# Patient Record
Sex: FEMALE | Race: WHITE
[De-identification: names, ages, dates, MRNs, and addresses within clinical notes are randomized per-mention and may not be internally consistent; named-entity substitution may affect disease eponyms.]

---

## 2018-05-14 ENCOUNTER — HOSPITAL ENCOUNTER (INPATIENT)
Dept: HOSPITAL 31 - C.EROB | Age: 33
LOS: 3 days | Discharge: HOME | End: 2018-05-17
Attending: OBSTETRICS & GYNECOLOGY | Admitting: OBSTETRICS & GYNECOLOGY
Payer: COMMERCIAL

## 2018-05-14 VITALS — BODY MASS INDEX: 27.4 KG/M2

## 2018-05-14 DIAGNOSIS — Z3A.39: ICD-10-CM

## 2018-05-14 DIAGNOSIS — O41.03X0: ICD-10-CM

## 2018-05-14 LAB
ALBUMIN SERPL-MCNC: 3.1 G/DL (ref 3.5–5)
ALBUMIN/GLOB SERPL: 1 {RATIO} (ref 1–2.1)
ALT SERPL-CCNC: 11 U/L (ref 9–52)
AST SERPL-CCNC: 19 U/L (ref 14–36)
BACTERIA #/AREA URNS HPF: (no result) /[HPF]
BASOPHILS # BLD AUTO: 0.1 K/UL (ref 0–0.2)
BASOPHILS NFR BLD: 1.3 % (ref 0–2)
BILIRUB UR-MCNC: NEGATIVE MG/DL
BUN SERPL-MCNC: 6 MG/DL (ref 7–17)
CALCIUM SERPL-MCNC: 8.6 MG/DL (ref 8.6–10.4)
EOSINOPHIL # BLD AUTO: 0.1 K/UL (ref 0–0.7)
EOSINOPHIL NFR BLD: 0.8 % (ref 0–4)
ERYTHROCYTE [DISTWIDTH] IN BLOOD BY AUTOMATED COUNT: 14.3 % (ref 11.5–14.5)
GFR NON-AFRICAN AMERICAN: > 60
GLUCOSE UR STRIP-MCNC: NORMAL MG/DL
HGB BLD-MCNC: 12.6 G/DL (ref 11–16)
LEUKOCYTE ESTERASE UR-ACNC: (no result) LEU/UL
LYMPHOCYTES # BLD AUTO: 1.3 K/UL (ref 1–4.3)
LYMPHOCYTES NFR BLD AUTO: 16.4 % (ref 20–40)
MCH RBC QN AUTO: 31.4 PG (ref 27–31)
MCHC RBC AUTO-ENTMCNC: 34 G/DL (ref 33–37)
MCV RBC AUTO: 92.2 FL (ref 81–99)
MONOCYTES # BLD: 0.6 K/UL (ref 0–0.8)
MONOCYTES NFR BLD: 7.9 % (ref 0–10)
NEUTROPHILS # BLD: 6 K/UL (ref 1.8–7)
NEUTROPHILS NFR BLD AUTO: 73.6 % (ref 50–75)
NRBC BLD AUTO-RTO: 0.1 % (ref 0–2)
PH UR STRIP: 7 [PH] (ref 5–8)
PLATELET # BLD: 148 K/UL (ref 130–400)
PMV BLD AUTO: 10.7 FL (ref 7.2–11.7)
PROT UR STRIP-MCNC: NEGATIVE MG/DL
RBC # BLD AUTO: 4 MIL/UL (ref 3.8–5.2)
RBC # UR STRIP: (no result) /UL
SP GR UR STRIP: 1.01 (ref 1–1.03)
SQUAMOUS EPITHIAL: 8 /HPF (ref 0–5)
UROBILINOGEN UR-MCNC: NORMAL MG/DL (ref 0.2–1)
WBC # BLD AUTO: 8.2 K/UL (ref 4.8–10.8)

## 2018-05-14 NOTE — OBHP
===========================

Datetime: 2018 10:12

===========================

   

IP Adm Impression:  Term, intrauterine pregnancy

IP Admit Plan:  Admit to unit

Admit Comment, IP Provider:   @ 39.1 wkws [place] c/o vaginal bleeidn and sidhcarge since [facility]
reaseing intensty adn fruqency, bright red, no blood clots with cramping q 5-10 min /10, deisre pain
 medicaion. pt dnie slof, +FM

      

   OB: P0

   GYN: dnies hx of anobmral pap, fiboidf,s ovairna cyst, STI

   PMH: Hypothyoid

   PSH: dnies

   MEDS: PNV

   SHX: negaitve etoh/tobacco.drugs

   NKDA

      

   A/P  @ 39.1 wks GA with VB iol for oligohydmanions

   -admit to L+D

   np, ivf

   bedside sonogram rhiannon 4.8cm, no evidcne of placenta previa

   cytoce

   pain mangment

   con tcurent mange

      

Pelvic Type - PN:  Adequate

Extremities - PN:  Normal

Abdomen - PN:  Normal

Back - PN:  Normal

Breast - PN:  Normal

Lungs - PN:  Normal

Heart - PN:  Normal

Thyroid - PN:  Normal

Neurologic - PN:  Normal

HEENT - PN:  Normal

General - PN:  Normal

FHR - Baseline A Provider:  125

Membranes, Provider:  Intact

Contraction Comments Provider:  5-10 min

IP Prenatal Hx Assessment:  The Prenatal History has been Reviewed and is Current

EGA AdmitDate IP:  39.1

Vital Signs Provider:  Reviewed; Within Normal Limits

IP Chief Complaint:  Uterine contractions

NICHD Variability Prov Fetus A:  Moderate 6-25bpm

FHR Category Provider Fetus A:  Category I

NICHD Decel Fetus A IP Provider:  None

Dilatation, Provider:  2-3

Effacement, Provider:  50

Station, Provider:  -3

Genitourinary Exam:  Normal

DTRs - PN:  Normal

## 2018-05-14 NOTE — OBPN
===========================

Datetime: 2018 12:47

===========================

   

IP Progress Impression:  Normal progression of labor

IP Progress Plan:  Continue present management

Membranes, Provider:  Intact

FHR - Baseline A Provider:  125

Gestation - Est Wks by US:  39.1

IP Progress Note Comment:  pt ting and examien rperots cramping on and off 8/10 denies lof, vb, +FM

   vss

   ve; 4cm

      

   A/P  @ 39.1 wks GA in labor

   s/p cytotec

   for pitocin augmentaiotn

   pain mangment

   cont current mangnet

      

Vital Signs Provider:  Reviewed; Within Normal Limits

FHR Category Provider Fetus A:  Category I

NICHD Variability Prov Fetus A:  Moderate 6-25bpm

Dilatation, Provider:  4

NICHD Decel Fetus A IP Provider:  None

   

===========================

Datetime: 2018 10:12

===========================

   

Contraction Comments Provider:  5-10 min

Effacement, Provider:  50

Station, Provider:  -3

## 2018-05-14 NOTE — OBPN
===========================

Datetime: 2018 18:00

===========================

   

IP Progress Impression:  Normal progression of labor

IP Progress Plan:  Continue present management

Membranes, Provider:  Ruptured

FHR - Baseline A Provider:  125

Gestation - Est Wks by US:  39.1

IP Progress Note Comment:  pt ting and examien reports ctx pain that ocmes and goes

   vss

   ve; 5cm

      

   A/P  @ 39.1 wks GA in labor

   s/p cytotec

   for pitocin augmentaiotn, iupc

   cont current managment

   pain mangment

   cont current mangnet

      

Vital Signs Provider:  Reviewed; Within Normal Limits

FHR Category Provider Fetus A:  Category I

Dilatation, Provider:  6

NICHD Decel Fetus A IP Provider:  None

## 2018-05-15 LAB
ALBUMIN SERPL-MCNC: 2.3 G/DL (ref 3.5–5)
ALBUMIN/GLOB SERPL: 0.9 {RATIO} (ref 1–2.1)
ALT SERPL-CCNC: 21 U/L (ref 9–52)
AST SERPL-CCNC: 24 U/L (ref 14–36)
BASOPHILS # BLD AUTO: 0.1 K/UL (ref 0–0.2)
BASOPHILS NFR BLD: 0.4 % (ref 0–2)
BUN SERPL-MCNC: 5 MG/DL (ref 7–17)
CALCIUM SERPL-MCNC: 8.1 MG/DL (ref 8.6–10.4)
EOSINOPHIL # BLD AUTO: 0 K/UL (ref 0–0.7)
EOSINOPHIL NFR BLD: 0 % (ref 0–4)
ERYTHROCYTE [DISTWIDTH] IN BLOOD BY AUTOMATED COUNT: 13.7 % (ref 11.5–14.5)
GFR NON-AFRICAN AMERICAN: > 60
HGB BLD-MCNC: 10.7 G/DL (ref 11–16)
LYMPHOCYTE: 8 % (ref 20–40)
LYMPHOCYTES # BLD AUTO: 1 K/UL (ref 1–4.3)
LYMPHOCYTES NFR BLD AUTO: 7.3 % (ref 20–40)
MCH RBC QN AUTO: 31.7 PG (ref 27–31)
MCHC RBC AUTO-ENTMCNC: 34.4 G/DL (ref 33–37)
MCV RBC AUTO: 92.2 FL (ref 81–99)
MONOCYTE: 6 % (ref 0–10)
MONOCYTES # BLD: 1 K/UL (ref 0–0.8)
MONOCYTES NFR BLD: 7.5 % (ref 0–10)
NEUTROPHILS # BLD: 11.2 K/UL (ref 1.8–7)
NEUTROPHILS NFR BLD AUTO: 77 % (ref 50–75)
NEUTROPHILS NFR BLD AUTO: 84.8 % (ref 50–75)
NEUTS BAND NFR BLD: 9 % (ref 0–2)
NRBC BLD AUTO-RTO: 0.1 % (ref 0–2)
PLATELET # BLD EST: (no result) 10*3/UL
PLATELET # BLD: 117 K/UL (ref 130–400)
PMV BLD AUTO: 10.2 FL (ref 7.2–11.7)
RBC # BLD AUTO: 3.38 MIL/UL (ref 3.8–5.2)
RBC MORPH BLD: NORMAL
TOTAL CELLS COUNTED BLD: 100
WBC # BLD AUTO: 13.3 K/UL (ref 4.8–10.8)

## 2018-05-15 RX ADMIN — OXYCODONE HYDROCHLORIDE AND ACETAMINOPHEN PRN TAB: 5; 325 TABLET ORAL at 17:23

## 2018-05-15 RX ADMIN — OXYCODONE HYDROCHLORIDE AND ACETAMINOPHEN PRN TAB: 5; 325 TABLET ORAL at 10:50

## 2018-05-15 RX ADMIN — OXYCODONE HYDROCHLORIDE AND ACETAMINOPHEN PRN TAB: 5; 325 TABLET ORAL at 21:38

## 2018-05-15 RX ADMIN — SIMETHICONE CHEW TAB 80 MG SCH MG: 80 TABLET ORAL at 10:49

## 2018-05-15 RX ADMIN — SIMETHICONE CHEW TAB 80 MG SCH MG: 80 TABLET ORAL at 14:01

## 2018-05-15 RX ADMIN — SIMETHICONE CHEW TAB 80 MG SCH MG: 80 TABLET ORAL at 17:23

## 2018-05-15 RX ADMIN — SIMETHICONE CHEW TAB 80 MG SCH MG: 80 TABLET ORAL at 22:20

## 2018-05-15 NOTE — PCM.SURG1
Surgeon's Initial Post Op Note





- Surgeon's Notes


Surgeon: Emily Biswas MD


Assistant: John Villagomez MD


Type of Anesthesia: Spinal, Other


Pre-Operative Diagnosis: Term Intrauterine pregnancy, arrest of diltaion


Operative Findings: live femlae infnat apgar 9,9 weight of 8lbs 2 ounces, 

normal appeairn gutuers, tubes and ovaries b/l. pediatrician present for 

delivery. Dr John Villagomez was surgical assistant and prsent for entire case and 

essential in gainin burns, retractin, epxoures, holding bladder blade, closing 

all layers and obtainng hemostasis.


Post-Operative Diagnosis: same as above


Operation Performed: Primary Low transverse cesearen section


Specimen/Specimens Removed: placenta


Estimated Blood Loss: EBL {In ML}: 800


Blood Products Given: N/A


Drains Used: No Drains


Post-Op Condition: Good


Date of Surgery/Procedure: 05/15/18


Time of Surgery/Procedure: 23:15

## 2018-05-15 NOTE — OBPPN
===========================

Datetime: 05/15/2018 06:59

===========================

   

PP Pain Prov:  Within normal limits

PP Nausea Prov:  Denies

PP Flatus Prov:  No

PP BM Prov:  No

PP Breasts Prov:  Normal

PP Heart Prov:  Normal

PP Lungs Prov:  Normal

PP Abdomen/Uterus Prov:  Normal

PP Lochia Prov:  Normal

PP Vulva/Perineum Prov:  Normal

PP CVA Tenderness Prov:  Normal

PP Extremities Prov:  Normal

PP C/S Incision Prov:  Normal

PP Breastfeeding Progress Prov:  Normal

PP Impression Prov:  Normal postpartum progression

PP Plan Prov:  Continue present management

PP Progress Note Prov:  pt seen and examiend adn reprots pain controlled with medicaion. pt dnei any 
fever, chills, naue, vomitign, cp, sob. pt not yet ambaitng, +ayala cathether, not passign flatu, toe
lrating liuid and is breat feeding

   VSS

   PE GEN NAD AA ox 3

   RESP CTAB?l

   CVS: RRR< +S1/S2

   BREAST NT, NOn engorage db/l

   ABD: soft, NT/ND, +BS

   FUNDUS: Firm, at level o fumbilcs

   VE: minimal lochia, non foul smelling

   EXT: no calef tendnerness, engaive homans sign

      

   A/P s/p PLTCS POD #1 with hypothryoid

   -retart synthroid

   -Pain manamgnet: perocet/motirn

   -Advance diet at tolerated

   -dc ayala

   -encourage breast feedign adn mabuaiton

   -bowel ergime

   -Incentive spirometer

   -am labs

   -cont routine postop care

IP PP Procedures:  None

Vital Signs Provider PP:  Reviewed; Within Normal Limits

## 2018-05-15 NOTE — OBDS
===================================

DELIVERY PERSONNEL

===================================

   

Delivery Doctor:  

Scrub Nurse:  Kati Parker

Circulator:  Mansi Millard RN

Anesthesiologist:  

   

===================================

MATERNAL INFORMATION

===================================

   

Delivery Anesthesia:  Epidural

Maternal Complications:  None

Provider Comments:  live fmeae infant op presentaiotn tihgt nuchal cor dx 1, 8lbs 2 ounces , ebl 800 
ml pagars 9,9 

   normal appearin uterus, tubes and ovariers b/l

   arrest of dilation

   

===================================

LABOR SUMMARY

===================================

   

EDC:  2018 00:00

No. Babies in Womb:  0

 Attempted:  No

Labor Anesthesia:  Epidural

   

===================================

LABOR INFORMATION

===================================

   

Cervical Ripening Agents:  Cytotec @ (Annotations: Cytotec 25 mcg by PO bucally x 1 dose)

Oxytocin:  Augmentation

Group B Beta Strep:  Negative (Annotations: 18)

Steroids Given:  None

Reason Steroids Not Administered:  Not Applicable

   

===================================

MEMBRANES

===================================

   

Membranes Rupture Method:  Spontaneous

Rupture of Membranes:  2018 13:57

Length of Rupture (hrs):  9.68

Amniotic Fluid Color:  Clear

Amniotic Fluid Amount:  Scant

Amniotic Fluid Odor:  Normal

   

===================================

STAGES OF LABOR

===================================

   

Stage 3 hrs:  0

Stage 3 min:  1

   

===================================

VAGINAL DELIVERY

===================================

   

Episiotomy:  None

Laceration Extension:  N/A

Laceration Type:  None

   

===================================

CSECTION DELIVERY

===================================

   

Primary Indication:  Arrest Dilatation

CSection Urgency:  Elective

CSection Incidence:  Primary

Labor:  Labor

Elective:  Elective

CSection Incision:  Lower Uterine Transverse

   

===================================

BABY A INFORMATION

===================================

   

Infant Delivery Date/Time:  2018 23:38

Method of Delivery:  

Born in Route :  No

:  N/A

Forceps:  N/A

Vacuum Extraction:  N/A

Shoulder Dystocia :  No

   

===================================

SHOULDER DYSTOCIA BABY A

===================================

   

Infant Delivery Date/Time:  2018 23:38

   

===================================

PRESENTATION/POSITION BABY A

===================================

   

Presentation:  Cephalic

Cephalic Presentation:  Vertex

Breech Presentation:  N/A

   

===================================

PLACENTA INFORMATION BABY A

===================================

   

Placenta Delivery Time :  2018 23:39

Placenta Method of Delivery:  Spontaneous

Placenta Status:  Delivered

   

===================================

APGAR SCORES BABY A

===================================

   

Heart Rate 1 min:  >100 bpm

Resp Effort 1 min:  Good Cry

Reflex Irritability 1 min:  Cough or Sneeze or Pulls Away

Muscle Tone 1 min:  Active Motion

Color 1 min:  Body Pink, Extremities Blue

APGAR SCORE 1 MIN:  9

Heart Rate 5 min:  >100 bpm

Resp Effort 5 min:  Good Cry

Reflex Irritability 5 min:  Cough or Sneeze or Pulls Away

Muscle Tone 5 min:  Active Motion

Color 5 min:  Body Pink, Extremities Blue

APGAR SCORE 5 MIN:  9

   

===================================

INFANT INFORMATION BABY A

===================================

   

Gestational Age at Delivery:  39.1

Gestational Status:  Term

Infant Outcome :  Liveborn

Infant Condition :  Stable

Infant Sex:  Female

   

===================================

IDENTIFICATION/MEDS BABY A

===================================

   

ID Band Number:  [id number]

ID Band Location:  Left Leg; Left Arm



Sensor Applied:  Yes

Sensor Number:  [id number]

Sensor Location :  Cord Clamp

   

===================================

WEIGHT/LENGTH BABY A

===================================

   

Infant Birthweight (gms):  3685

Infant Weight (lb):  8

Infant Weight (oz):  2

Infant Length Inches:  20.00

Infant Length cms:  50.8

   

===================================

CORD INFORMATION BABY A

===================================

   

No. Cord Vessels:  3

Nuchal Cord :  Around Neck x1, Tight

Cord Blood Taken:  Yes

Infant Suction:  Nose

   

===================================

ASSESSMENT BABY A

===================================

   

Infant Complications:  None

Physical Findings at Delivery:  Within Normal Limits

Infant Respirations:  Appears Normal

Neonatologist/ALS Called :  No

Infant Care By:  

Transferred To:  Remains with Mother

## 2018-05-15 NOTE — OP
PROCEDURE DATE:  2018



SURGEON:  Emily Biswas MD



ASSISTANT:  Dr. Jean Carlos Villagomez



TYPE OF ANESTHESIA:  Spinal epidural.



PREOPERATIVE DIAGNOSES:  Term intrauterine pregnancy, arrest of dilation.



POSTOPERATIVE DIAGNOSES:  Term intrauterine pregnancy, arrest of dilation.



OPERATIVE FINDINGS:  Live female infant, Apgars 9 and 9, weight of 8 pounds

10 ounces.  Normal-appearing uterus, tubes and ovaries.  Tight nuchal cord

x1.  OP presentation.  Pediatrician was present for the delivery.  Dr. Jean Carlos Villagomez was surgical assistant who was present for the entire case and

assisted in gaining entry, retraction, exposure, holding the bladder blade,

closing all layers, and obtaining hemostasis.



OPERATION PERFORMED:  Primary low transverse  section.



SPECIMEN REMOVED:  Placenta.



ESTIMATED BLOOD LOSS:  800 mL.



BLOOD PRODUCTS:  None.



COMPLICATIONS:  None.



DESCRIPTION OF THE PROCEDURE:  The patient is a G1, P0 who was in induction

of labor, oligohydramnios who subsequently was induced with _____ arrest of

dilation.  Risks, benefits, alternatives, indications of primary low

transverse  section was discussed with the patient.  The patient

agreed, consented.  The patient was transferred to the operating room where

she was given preoperative prophylactic antibiotics.  After time-out was

performed, a Pfannenstiel skin incision was made with a scalpel and carried

down to the underlying layer of the fascia with the Bovie.  The fascia was

incised in the midline, and the incision was extended laterally with the

Bovie.  The inferior aspect of the fascial incision was grasped with Allis

and Kocher clamps, and the underlying rectus muscles dissected off bluntly.

Attention was then turned to the superior aspect in a similar fashion, was

grasped with Allis and Kocher clamps, and the underlying rectus muscles

were dissected off bluntly.  The rectus muscles were then bluntly 

in the midline.  The peritoneum was identified and entered into clear

space.  The incision was extended laterally and superiorly until good

visualization of the bladder.  The lower end of the Brooks was then

re-inserted, and the lower uterine segment was incised in a transverse

fashion.  The uterine incision was extended laterally and bluntly.  The

surgeon's hand entered the uterine cavity.  The infant head was delivered

atraumatically.  Nuchal cord tight was reduced x1 followed by delivery of

shoulders, followed by delivery of the body.  Both oral and nasal passages

of the baby were bulb suctioned.  Umbilical cord was clamped and cut.  Baby

was handed off to the awaiting pediatrician.  Cord blood and cord gases

were collected and sent x2.  The placenta was then delivered manually.  The

uterus was exteriorized, cleared of clots and debris.  The uterine incision

was repaired with 0-Vicryl in a running continuous locked fashion.  A

second layer of the same suture was used to close the uterus in a running

imbricated manner.  There was good hemostasis noted at the uterine incision

site.  There were normal tubes and ovaries.  The uterus was then returned

to the abdomen.  The pericolic gutters were cleared of all clots and

debris.  The peritoneum was reapproximated and closed with 2-0 chromic in a

running continuous fashion.  The rectus was reapproximated and closed with

2-0 chromic in an interrupted manner.  The fascia was reapproximated and

closed with 0 Vicryl in a running continuous fashion.  Subcutaneous space

was closed with 2-0 plain in an interrupted manner.  The skin was

reapproximated and closed with 4-0 Monocryl in a running subcuticular

fashion.  At the end of the procedure, all needles, sponge, and instrument

counts were noted to be correct x2.  The patient tolerated the procedure

well and was transferred to the recovery room in stable condition.





__________________________________________

Emily Biswas MD





DD:  05/15/2018 0:31:12

DT:  05/15/2018 3:08:04

Job # [id number]

## 2018-05-16 VITALS — RESPIRATION RATE: 20 BRPM

## 2018-05-16 RX ADMIN — SIMETHICONE CHEW TAB 80 MG SCH MG: 80 TABLET ORAL at 10:59

## 2018-05-16 RX ADMIN — SIMETHICONE CHEW TAB 80 MG SCH MG: 80 TABLET ORAL at 21:43

## 2018-05-16 RX ADMIN — SIMETHICONE CHEW TAB 80 MG SCH MG: 80 TABLET ORAL at 14:50

## 2018-05-16 RX ADMIN — OXYCODONE HYDROCHLORIDE AND ACETAMINOPHEN PRN TAB: 5; 325 TABLET ORAL at 11:00

## 2018-05-16 RX ADMIN — SIMETHICONE CHEW TAB 80 MG SCH MG: 80 TABLET ORAL at 18:16

## 2018-05-16 RX ADMIN — OXYCODONE HYDROCHLORIDE AND ACETAMINOPHEN PRN TAB: 5; 325 TABLET ORAL at 15:24

## 2018-05-17 VITALS
HEART RATE: 88 BPM | DIASTOLIC BLOOD PRESSURE: 78 MMHG | SYSTOLIC BLOOD PRESSURE: 123 MMHG | TEMPERATURE: 99 F | OXYGEN SATURATION: 98 %

## 2018-05-17 LAB
BASOPHILS # BLD AUTO: 0 K/UL (ref 0–0.2)
BASOPHILS NFR BLD: 0.4 % (ref 0–2)
EOSINOPHIL # BLD AUTO: 0.1 K/UL (ref 0–0.7)
EOSINOPHIL NFR BLD: 1.9 % (ref 0–4)
ERYTHROCYTE [DISTWIDTH] IN BLOOD BY AUTOMATED COUNT: 13.8 % (ref 11.5–14.5)
HGB BLD-MCNC: 10.5 G/DL (ref 11–16)
LYMPHOCYTES # BLD AUTO: 1.4 K/UL (ref 1–4.3)
LYMPHOCYTES NFR BLD AUTO: 19.6 % (ref 20–40)
MCH RBC QN AUTO: 32.1 PG (ref 27–31)
MCHC RBC AUTO-ENTMCNC: 34.6 G/DL (ref 33–37)
MCV RBC AUTO: 92.7 FL (ref 81–99)
MONOCYTES # BLD: 0.6 K/UL (ref 0–0.8)
MONOCYTES NFR BLD: 8.7 % (ref 0–10)
NEUTROPHILS # BLD: 5 K/UL (ref 1.8–7)
NEUTROPHILS NFR BLD AUTO: 69.4 % (ref 50–75)
NRBC BLD AUTO-RTO: 0.1 % (ref 0–2)
PLATELET # BLD: 161 K/UL (ref 130–400)
PMV BLD AUTO: 9.2 FL (ref 7.2–11.7)
RBC # BLD AUTO: 3.29 MIL/UL (ref 3.8–5.2)
WBC # BLD AUTO: 7.3 K/UL (ref 4.8–10.8)

## 2018-05-17 RX ADMIN — SIMETHICONE CHEW TAB 80 MG SCH MG: 80 TABLET ORAL at 10:25

## 2024-04-28 NOTE — OBADHP
===========================

Datetime: 2018 10:12

===========================

   

Admit Comment, IP Provider:   @ 39.1 wkws [place] c/o vaginal bleeidn and sidhcarge since [facility]
reaseing intensty adn fruqency, bright red, no blood clots with cramping q 5-10 min 6/10, deisre pain
 medicaion. pt dnie slof, +FM

      

   OB: P0

   GYN: dnies hx of anobmral pap, fiboidf,s ovairna cyst, STI

   PMH: Hypothyoid

   PSH: dnies

   MEDS: PNV

   SHX: negaitve etoh/tobacco.drugs

   NKDA

      

   A/P  @ 39.1 wks GA with VB iol for oligohydmanions

   -admit to L+D

   np, ivf

   bedside sonogram rhiannon 4.8cm, no evidcne of placenta previa

   cytoce

   pain mangment

   con tcurent mange

      

Pelvic Type - PN:  Adequate

Extremities - PN:  Normal

Abdomen - PN:  Normal

Back - PN:  Normal

Breast - PN:  Normal

Lungs - PN:  Normal

Heart - PN:  Normal

Thyroid - PN:  Normal

Neurologic - PN:  Normal

HEENT - PN:  Normal

General - PN:  Normal

FHR - Baseline A Provider:  125

Membranes, Provider:  Intact

Contraction Comments Provider:  5-10 min

IP Prenatal Hx Assessment:  The Prenatal History has been Reviewed and is Current

Vital Signs Provider:  Reviewed; Within Normal Limits

IP Chief Complaint:  Uterine contractions

NICHD Variability Prov Fetus A:  Moderate 6-25bpm

FHR Category Provider Fetus A:  Category I

NICHD Decel Fetus A IP Provider:  None

Dilatation, Provider:  2-3

Effacement, Provider:  50

Station, Provider:  -3

Genitourinary Exam:  Normal

DTRs - PN:  Normal

EGA AdmitDate IP:  39.1

IP Adm Impression:  Term, intrauterine pregnancy

IP Admit Plan:  Admit to unit
No